# Patient Record
Sex: FEMALE | Race: WHITE | URBAN - METROPOLITAN AREA
[De-identification: names, ages, dates, MRNs, and addresses within clinical notes are randomized per-mention and may not be internally consistent; named-entity substitution may affect disease eponyms.]

---

## 2020-10-27 ENCOUNTER — TELEPHONE (OUTPATIENT)
Dept: FAMILY MEDICINE CLINIC | Age: 20
End: 2020-10-27

## 2020-10-27 NOTE — TELEPHONE ENCOUNTER
MOP calling because she would like to get pt set up with a new patient appt with Dr. Dimas Cruz  Does not have a PCP at this time but was referred by Negro Hamper  Please advise

## 2020-10-27 NOTE — TELEPHONE ENCOUNTER
Spoke with Patricia Daniel informed her that we cannot she her due to PCP not being in one of our offices.